# Patient Record
Sex: FEMALE | Race: ASIAN | NOT HISPANIC OR LATINO | URBAN - METROPOLITAN AREA
[De-identification: names, ages, dates, MRNs, and addresses within clinical notes are randomized per-mention and may not be internally consistent; named-entity substitution may affect disease eponyms.]

---

## 2021-02-25 ENCOUNTER — OFFICE (OUTPATIENT)
Dept: URBAN - METROPOLITAN AREA TELEHEALTH 12 | Facility: TELEHEALTH | Age: 76
End: 2021-02-25

## 2021-02-25 VITALS — WEIGHT: 127 LBS | HEIGHT: 61 IN

## 2021-02-25 DIAGNOSIS — R19.4 CHANGE IN BOWEL HABIT: ICD-10-CM

## 2021-02-25 DIAGNOSIS — R11.2 NAUSEA WITH VOMITING, UNSPECIFIED: ICD-10-CM

## 2021-02-25 DIAGNOSIS — Z80.0 FAMILY HISTORY OF MALIGNANT NEOPLASM OF DIGESTIVE ORGANS: ICD-10-CM

## 2021-02-25 DIAGNOSIS — R10.13 EPIGASTRIC PAIN: ICD-10-CM

## 2021-02-25 DIAGNOSIS — K30 FUNCTIONAL DYSPEPSIA: ICD-10-CM

## 2021-02-25 DIAGNOSIS — Z86.010 PERSONAL HISTORY OF COLONIC POLYPS: ICD-10-CM

## 2021-02-25 DIAGNOSIS — K31.84 GASTROPARESIS: ICD-10-CM

## 2021-02-25 PROCEDURE — 99204 OFFICE O/P NEW MOD 45 MIN: CPT | Mod: 95 | Performed by: INTERNAL MEDICINE

## 2021-02-25 NOTE — SERVICEHPINOTES
PATIENT VERIFIED BY DATE OF BIRTH AND NAME. Patient has been consented for this telecommunication visit.   75 yo woman with a history of DM, family history of mother with colon cancer,  previous EGD and colonoscopy last 2015 revealing polyp, hiatal hernia, reflux esophagitis and mild gastritis. She has not followed up recently with anyone due to taking care of her  who recently passed away from colon cancer. She has DM with last HgbA1C 8.6 checked in 2019. States her sugars run a little high in the mornings. Has some nausea and poor digestion symptoms ongoing, as well as increased constipation and narrowed stool girth. Requires Dulcolax for bms frequently. Has also some globus sensation as of recently also. Wt is stable.

## 2021-02-26 ENCOUNTER — OFFICE (OUTPATIENT)
Dept: URBAN - METROPOLITAN AREA CLINIC 34 | Facility: CLINIC | Age: 76
End: 2021-02-26

## 2021-02-26 PROCEDURE — 00038: CPT | Performed by: INTERNAL MEDICINE

## 2021-03-06 LAB
CBC (INCLUDES DIFF/PLT): ABSOLUTE BAND NEUTROPHILS: NORMAL CELLS/UL
CBC (INCLUDES DIFF/PLT): ABSOLUTE BASOPHILS: 10 CELLS/UL (ref 0–200)
CBC (INCLUDES DIFF/PLT): ABSOLUTE BLASTS: NORMAL CELLS/UL
CBC (INCLUDES DIFF/PLT): ABSOLUTE EOSINOPHILS: 316 CELLS/UL (ref 15–500)
CBC (INCLUDES DIFF/PLT): ABSOLUTE LYMPHOCYTES: 2147 CELLS/UL (ref 850–3900)
CBC (INCLUDES DIFF/PLT): ABSOLUTE METAMYELOCYTES: NORMAL CELLS/UL
CBC (INCLUDES DIFF/PLT): ABSOLUTE MONOCYTES: 316 CELLS/UL (ref 200–950)
CBC (INCLUDES DIFF/PLT): ABSOLUTE MYELOCYTES: NORMAL CELLS/UL
CBC (INCLUDES DIFF/PLT): ABSOLUTE NEUTROPHILS: 2310 CELLS/UL (ref 1500–7800)
CBC (INCLUDES DIFF/PLT): ABSOLUTE NUCLEATED RBC: NORMAL CELLS/UL
CBC (INCLUDES DIFF/PLT): ABSOLUTE PROMYELOCYTES: NORMAL CELLS/UL
CBC (INCLUDES DIFF/PLT): BAND NEUTROPHILS: NORMAL %
CBC (INCLUDES DIFF/PLT): BASOPHILS: 0.2 %
CBC (INCLUDES DIFF/PLT): BLASTS: NORMAL %
CBC (INCLUDES DIFF/PLT): COMMENT(S): NORMAL
CBC (INCLUDES DIFF/PLT): EOSINOPHILS: 6.2 %
CBC (INCLUDES DIFF/PLT): HEMATOCRIT: 35.5 % (ref 35–45)
CBC (INCLUDES DIFF/PLT): HEMOGLOBIN: 11.7 G/DL (ref 11.7–15.5)
CBC (INCLUDES DIFF/PLT): LYMPHOCYTES: 42.1 %
CBC (INCLUDES DIFF/PLT): MCH: 30.4 PG (ref 27–33)
CBC (INCLUDES DIFF/PLT): MCHC: 33 G/DL (ref 32–36)
CBC (INCLUDES DIFF/PLT): MCV: 92.2 FL (ref 80–100)
CBC (INCLUDES DIFF/PLT): METAMYELOCYTES: NORMAL %
CBC (INCLUDES DIFF/PLT): MONOCYTES: 6.2 %
CBC (INCLUDES DIFF/PLT): MPV: 10.9 FL (ref 7.5–12.5)
CBC (INCLUDES DIFF/PLT): MYELOCYTES: NORMAL %
CBC (INCLUDES DIFF/PLT): NEUTROPHILS: 45.3 %
CBC (INCLUDES DIFF/PLT): NUCLEATED RBC: NORMAL /100 WBC
CBC (INCLUDES DIFF/PLT): PLATELET COUNT: 160 THOUSAND/UL (ref 140–400)
CBC (INCLUDES DIFF/PLT): PROMYELOCYTES: NORMAL %
CBC (INCLUDES DIFF/PLT): RDW: 12.5 % (ref 11–15)
CBC (INCLUDES DIFF/PLT): REACTIVE LYMPHOCYTES: NORMAL %
CBC (INCLUDES DIFF/PLT): RED BLOOD CELL COUNT: 3.85 MILLION/UL (ref 3.8–5.1)
CBC (INCLUDES DIFF/PLT): WHITE BLOOD CELL COUNT: 5.1 THOUSAND/UL (ref 3.8–10.8)
COMPREHENSIVE METABOLIC PANEL: ALBUMIN/GLOBULIN RATIO: 1.9 (CALC) (ref 1–2.5)
COMPREHENSIVE METABOLIC PANEL: ALBUMIN: 4.2 G/DL (ref 3.6–5.1)
COMPREHENSIVE METABOLIC PANEL: ALKALINE PHOSPHATASE: 62 U/L (ref 37–153)
COMPREHENSIVE METABOLIC PANEL: ALT: 13 U/L (ref 6–29)
COMPREHENSIVE METABOLIC PANEL: AST: 18 U/L (ref 10–35)
COMPREHENSIVE METABOLIC PANEL: BILIRUBIN, TOTAL: 0.5 MG/DL (ref 0.2–1.2)
COMPREHENSIVE METABOLIC PANEL: BUN/CREATININE RATIO: 27 (CALC) — HIGH (ref 6–22)
COMPREHENSIVE METABOLIC PANEL: CALCIUM: 9.4 MG/DL (ref 8.6–10.4)
COMPREHENSIVE METABOLIC PANEL: CARBON DIOXIDE: 27 MMOL/L (ref 20–32)
COMPREHENSIVE METABOLIC PANEL: CHLORIDE: 107 MMOL/L (ref 98–110)
COMPREHENSIVE METABOLIC PANEL: CREATININE: 1.38 MG/DL — HIGH (ref 0.6–0.93)
COMPREHENSIVE METABOLIC PANEL: EGFR AFRICAN AMERICAN: 43 ML/MIN/1.73M2 — LOW (ref 60–?)
COMPREHENSIVE METABOLIC PANEL: EGFR NON-AFR. AMERICAN: 37 ML/MIN/1.73M2 — LOW (ref 60–?)
COMPREHENSIVE METABOLIC PANEL: GLOBULIN: 2.2 G/DL (CALC) (ref 1.9–3.7)
COMPREHENSIVE METABOLIC PANEL: GLUCOSE: 103 MG/DL — HIGH (ref 65–99)
COMPREHENSIVE METABOLIC PANEL: POTASSIUM: 4.9 MMOL/L (ref 3.5–5.3)
COMPREHENSIVE METABOLIC PANEL: PROTEIN, TOTAL: 6.4 G/DL (ref 6.1–8.1)
COMPREHENSIVE METABOLIC PANEL: SODIUM: 141 MMOL/L (ref 135–146)
COMPREHENSIVE METABOLIC PANEL: UREA NITROGEN (BUN): 37 MG/DL — HIGH (ref 7–25)
HEMOGLOBIN A1C: 7.3 % OF TOTAL HGB — HIGH (ref ?–5.7)
LIPID PANEL, STANDARD: CHOL/HDLC RATIO: 4.1 (CALC) (ref ?–5)
LIPID PANEL, STANDARD: CHOLESTEROL, TOTAL: 189 MG/DL (ref ?–200)
LIPID PANEL, STANDARD: HDL CHOLESTEROL: 46 MG/DL — LOW (ref 50–?)
LIPID PANEL, STANDARD: LDL-CHOLESTEROL: 119 MG/DL (CALC) — HIGH
LIPID PANEL, STANDARD: NON HDL CHOLESTEROL: 143 MG/DL (CALC) — HIGH (ref ?–130)
LIPID PANEL, STANDARD: TRIGLYCERIDES: 126 MG/DL (ref ?–150)
TSH: 0.88 MIU/L (ref 0.4–4.5)

## 2021-04-28 ENCOUNTER — ON CAMPUS - OUTPATIENT (OUTPATIENT)
Dept: URBAN - METROPOLITAN AREA HOSPITAL 16 | Facility: HOSPITAL | Age: 76
End: 2021-04-28
Payer: COMMERCIAL

## 2021-04-28 DIAGNOSIS — K63.5 POLYP OF COLON: ICD-10-CM

## 2021-04-28 DIAGNOSIS — R10.13 EPIGASTRIC PAIN: ICD-10-CM

## 2021-04-28 DIAGNOSIS — R11.2 NAUSEA WITH VOMITING, UNSPECIFIED: ICD-10-CM

## 2021-04-28 DIAGNOSIS — K29.60 OTHER GASTRITIS WITHOUT BLEEDING: ICD-10-CM

## 2021-04-28 DIAGNOSIS — Z80.0 FAMILY HISTORY OF MALIGNANT NEOPLASM OF DIGESTIVE ORGANS: ICD-10-CM

## 2021-04-28 PROCEDURE — 43239 EGD BIOPSY SINGLE/MULTIPLE: CPT | Performed by: INTERNAL MEDICINE

## 2021-04-28 PROCEDURE — 45385 COLONOSCOPY W/LESION REMOVAL: CPT | Mod: PT | Performed by: INTERNAL MEDICINE

## 2025-04-03 ENCOUNTER — TELEHEALTH PROVIDED IN PATIENT'S HOME (OUTPATIENT)
Dept: URBAN - METROPOLITAN AREA TELEHEALTH 7 | Facility: TELEHEALTH | Age: 80
End: 2025-04-03
Payer: MEDICARE

## 2025-04-03 VITALS — WEIGHT: 130 LBS | HEIGHT: 60 IN

## 2025-04-03 DIAGNOSIS — R14.0 ABDOMINAL DISTENSION (GASEOUS): ICD-10-CM

## 2025-04-03 DIAGNOSIS — E11.9 TYPE 2 DIABETES MELLITUS WITHOUT COMPLICATIONS: ICD-10-CM

## 2025-04-03 DIAGNOSIS — K30 FUNCTIONAL DYSPEPSIA: ICD-10-CM

## 2025-04-03 DIAGNOSIS — K59.00 CONSTIPATION, UNSPECIFIED: ICD-10-CM

## 2025-04-03 PROCEDURE — 99204 OFFICE O/P NEW MOD 45 MIN: CPT | Mod: 95 | Performed by: PHYSICIAN ASSISTANT

## 2025-04-03 NOTE — SERVICENOTES
Patient was located in their home during visit., Patient's visit was conducted through [employer] video telecommunication. Patient consented before the start of visit as to understanding of privacy concerns, possible technological failure, and their responsibility of carrying out instructions of plan., I spent 25 minutes today reviewing the chart, talking with the patient, reviewing previous results with patient, discussing plan, and documenting. Patient understands and agrees with plan. Questions/concerns addressed.

## 2025-04-03 NOTE — SERVICEHPINOTES
PATIENT VERIFIED BY DATE OF BIRTH AND NAME. Patient has been consented for this telecommunication visit using [employer] application. 
christelle bourgeois   ANN MARIE REDDY   is a   80   year old    female who is being seen in consultation at the request of   PATTI PARHAM   for digestive concerns. Her daughter is with her to translate. She has PMH diabetes which has historically not been well controlled (HgbA1C in 8 range), HLD, HTN. She was seen in 2021 for nausea, bloating, constipation, dyspepsia. She had a colonoscopy with hyperplastic polyp removed and no further procedures recommended. She had an EGD with gastritis and GEJ irritation - bx of stomach with benign gastritis only duodenal bx wnl. christelle bourgeois Patient reports feeling bloated a lot and has to eat small portions. Has long h/o these symptoms but has been feeling somewhat worse in the past few months. She denies changes in her medications. Has not had any recent labs or blood sugar checks.   christelle bourgeois She is having a BM every 2-3 days. She hasn't been taking any laxatives. 
christelle bourgeois She denies heart disease. Reports doing pretty well with her health overall. She walks and takes hikes. 
christelle bourgeois Her weight is stable. No vomiting, black stools, blood in stools, or any other concerns. christelle bourgeois